# Patient Record
Sex: FEMALE | Race: WHITE | NOT HISPANIC OR LATINO
[De-identification: names, ages, dates, MRNs, and addresses within clinical notes are randomized per-mention and may not be internally consistent; named-entity substitution may affect disease eponyms.]

---

## 2019-02-01 ENCOUNTER — APPOINTMENT (OUTPATIENT)
Dept: ORTHOPEDIC SURGERY | Facility: CLINIC | Age: 59
End: 2019-02-01
Payer: COMMERCIAL

## 2019-02-01 VITALS
WEIGHT: 128 LBS | BODY MASS INDEX: 22.68 KG/M2 | DIASTOLIC BLOOD PRESSURE: 78 MMHG | SYSTOLIC BLOOD PRESSURE: 115 MMHG | HEIGHT: 63 IN

## 2019-02-01 DIAGNOSIS — M67.40 GANGLION, UNSPECIFIED SITE: ICD-10-CM

## 2019-02-01 PROCEDURE — 20610 DRAIN/INJ JOINT/BURSA W/O US: CPT | Mod: RT

## 2019-02-01 PROCEDURE — 99213 OFFICE O/P EST LOW 20 MIN: CPT | Mod: 25

## 2019-02-01 RX ORDER — AZITHROMYCIN 250 MG/1
250 TABLET, FILM COATED ORAL
Qty: 6 | Refills: 0 | Status: COMPLETED | COMMUNITY
Start: 2018-12-17

## 2019-02-01 NOTE — ADDENDUM
[FreeTextEntry1] : Documented by Evelin Lindsay, solely acting as a scribe for Dr. Jalen Murillo on 02/01/2019.\par \par All medical record entries made by the Scribe were at my, Dr. Jalen Murillo, direction and personally dictated by me on 02/01/2019 . I have reviewed the chart and agree that the record accurately reflects my personal performance of the history, physical exam, assessment and plan. I have also personally directed, reviewed, and agreed with the chart.

## 2019-02-01 NOTE — DISCUSSION/SUMMARY
[de-identified] : 58 year old female presents with right knee Ganglion cyst. Patient completed an MRI on 11/20/18, which revealed a complex cystic mass as the posterior aspect of the knee corresponding to the patient's palpable cyst, probable Ganglion cyst. We discussed the nature of the condition and treatment options, including operative and nonoperative intervention for symptom control. Patient wishes to have the cyst aspirated a this time. We discussed the possibility of having the patient excise this cyst at some point in the future. I advised her to attempt therapy with ice as needed. Patient will return on an as needed basis. The patient will call if any questions or problems should arise.

## 2019-02-01 NOTE — PHYSICAL EXAM
[Normal] : Gait: normal [LE] : Sensory: Intact in bilateral lower extremities [DP] : dorsalis pedis 2+ and symmetric bilaterally [PT] : posterior tibial 2+ and symmetric bilaterally [Poor Appearance] : well-appearing [Acute Distress] : not in acute distress [Obese] : not obese [de-identified] : The patient is a well developed, well nourished female in no apparent distress. She is alert and oriented X 3 with a pleasant mood and appropriate affect. \par \par On physical examination of the right knee, there is full range of motion. The patient walks with a normal gait and stands in neutral alignment. There is no effusion. No warmth or erythema is noted. The patella is non tender to palpation medially or laterally. There is no crepitus noted. The apprehension and grind tests are negative. The extensor mechanism is intact. There is no joint line tenderness elicited on palpation. The Marina sign is negative. The Lachman and pivot shift tests are negative. There is no varus or valgus laxity at 0 or 30 degrees. No posterolateral or anteromedial laxity is noted. There is a palpable mass localized to the posterior aspect of her knee. No other soft tissue or bony tenderness is noted. Quadriceps tone is normal. Neurovascular function is intact.  [de-identified] : MRI of the right knee reviewed today by Dr. Murillo shows a complex cystic mass at the posterior aspect of the knee corresponding to the patient's palpable cyst, probable Ganglion cyst.\par Please see attached report for details.

## 2020-03-10 ENCOUNTER — APPOINTMENT (OUTPATIENT)
Dept: ORTHOPEDIC SURGERY | Facility: CLINIC | Age: 60
End: 2020-03-10
Payer: COMMERCIAL

## 2020-03-10 VITALS
SYSTOLIC BLOOD PRESSURE: 120 MMHG | BODY MASS INDEX: 22.5 KG/M2 | HEIGHT: 63 IN | WEIGHT: 127 LBS | OXYGEN SATURATION: 98 % | DIASTOLIC BLOOD PRESSURE: 80 MMHG | HEART RATE: 66 BPM

## 2020-03-10 PROCEDURE — 99213 OFFICE O/P EST LOW 20 MIN: CPT

## 2020-03-18 NOTE — HISTORY OF PRESENT ILLNESS
[de-identified] : Alia returns today for evaluation of her left knee. She developed medial knee pain five weeks ago after going ice skating. She noted some initial swelling. SHe has had restriction in her ROm and some pain with stairs. She has been icing with some relief. SHe denies any instability or locking.  \par \par

## 2020-03-18 NOTE — PHYSICAL EXAM
[de-identified] : The patient is a well developed, well nourished female in no apparent distress. She is alert and oriented X 3 with a pleasant mood and appropriate affect. \par \par On physical examination of the left knee, there is full range of motion. The patient walks with a normal gait and stands in neutral alignment. There is trace  effusion. No warmth or erythema is noted. The patella is non tender to palpation medially or laterally. There is no crepitus noted. The apprehension and grind tests are negative. The extensor mechanism is intact. There is medial joint line tenderness. The Marina sign is absent. The Lachman and pivot shift tests are negative. There is no varus or valgus laxity at 0 or 30 degrees. No posterolateral or anteromedial laxity is noted. No masses are palpable. No other soft tissue or bony tenderness is noted. Quadriceps weakness is noted. Neurovascular function is intact. 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

## 2020-03-18 NOTE — DISCUSSION/SUMMARY
[de-identified] : Alia has mild medial compartment DJD which she irritated when skating. She will contine to ice her knee and will begin a stationary bike riding program. She will return on an as needed basis. She will call if any issues arise.

## 2020-03-18 NOTE — END OF VISIT
[FreeTextEntry3] : All medical record entries made by TERRY Madera, acting as a scribe for this encounter under the direction of Jalen Murillo MD . I have reviewed the chart and agree that the record accurately reflects my personal performance of the history, physical exam, assessment and plan. I have also personally directed, reviewed, and agreed with the chart.

## 2020-08-02 ENCOUNTER — TRANSCRIPTION ENCOUNTER (OUTPATIENT)
Age: 60
End: 2020-08-02

## 2020-09-17 ENCOUNTER — APPOINTMENT (OUTPATIENT)
Dept: ORTHOPEDIC SURGERY | Facility: CLINIC | Age: 60
End: 2020-09-17
Payer: COMMERCIAL

## 2020-09-17 VITALS
WEIGHT: 128 LBS | BODY MASS INDEX: 22.68 KG/M2 | DIASTOLIC BLOOD PRESSURE: 80 MMHG | HEIGHT: 63 IN | SYSTOLIC BLOOD PRESSURE: 100 MMHG

## 2020-09-17 DIAGNOSIS — S83.242A OTHER TEAR OF MEDIAL MENISCUS, CURRENT INJURY, LEFT KNEE, INITIAL ENCOUNTER: ICD-10-CM

## 2020-09-17 DIAGNOSIS — M25.462 EFFUSION, LEFT KNEE: ICD-10-CM

## 2020-09-17 PROCEDURE — 99213 OFFICE O/P EST LOW 20 MIN: CPT

## 2020-09-18 PROBLEM — M25.462 EFFUSION OF LEFT KNEE: Status: ACTIVE | Noted: 2020-03-18

## 2020-09-18 NOTE — HISTORY OF PRESENT ILLNESS
[de-identified] : Alia returns today with a new issue. She has developed progressive medial left knee pain and swelling. She denies any trauma or specific injury. She has had medial knee pain and limitations in flexion. She has cut back on exercise due to discomfort. She denies any locking or buckling.

## 2020-09-18 NOTE — PHYSICAL EXAM
[de-identified] : The patient is a well developed, well nourished female in no apparent distress. She is alert and oriented X 3 with a pleasant mood and appropriate affect.\par \par On physical examination of the left knee, her ROM is 0-125 degrees. The patient walks with a normal gait and stands in neutral alignment. There is trace effusion. No warmth or erythema is noted. The patella is non tender to palpation medially or laterally. There is no crepitus noted. The apprehension and grind tests are negative. The extensor mechanism is intact. There is medial joint line tenderness. The Marina sign is positive. The Lachman and pivot shift tests are negative. There is no varus or valgus laxity at 0 or 30 degrees. No posterolateral or anteromedial laxity is noted. No masses are palpable. No other soft tissue or bony tenderness is noted. Quadriceps weakness is noted. Neurovascular function is intact.

## 2020-09-18 NOTE — DISCUSSION/SUMMARY
[de-identified] : Alia has medial knee pain and swelling. She will begin a course of supervised PT. If unimproved we will consider a cortisone injection in November. All questions were answered. She will call if any issues arise

## 2020-10-28 ENCOUNTER — RESULT REVIEW (OUTPATIENT)
Age: 60
End: 2020-10-28

## 2021-08-16 ENCOUNTER — TRANSCRIPTION ENCOUNTER (OUTPATIENT)
Age: 61
End: 2021-08-16

## 2023-09-05 NOTE — PROCEDURE
[de-identified] : The right knee was prepped with Betadine and under sterile condition the aspiration was performed with a 21 gauge needle. Approximately 10 cc 's of thick sanguineous fluid was removed and sent for studies. The needle was introduced into the joint and aspiration was performed to ensure intra-articular placement. Upon withdrawal of the needle the site was cleaned with alcohol and a band aid applied. The patient tolerated the aspiration well and there were no adverse effects. Post aspiration instructions included no strenuous activity for 24 hours, cryotherapy and if there are any adverse effects to contact the office. 
no diplopia/no photophobia/no loss of vision L/no loss of vision R

## 2024-01-01 NOTE — HISTORY OF PRESENT ILLNESS
[de-identified] : 58 year old female presents to the office for a follow-up evaluation of her right knee.. Patient reports that she has a mass  localized to the posterior medial aspect of the knee, that has been present for the past year. She notes that the size of the mass ranges, and that it is currently not large. Patient saw her PMD, who advised that she consult with a plastic surgeon. After meeting with the plastic surgeon, the patient completed an MRI on 11/20/18, which revealed a complex cystic mass at the posterior aspect of the knee corresponding to the patient's palpable cyst, probable Ganglion cyst. She reports minimal pain but does have some restriction in her flexion ROM.  (2) more than 100 beats/min

## 2025-05-27 ENCOUNTER — APPOINTMENT (OUTPATIENT)
Dept: ORTHOPEDIC SURGERY | Facility: CLINIC | Age: 65
End: 2025-05-27
Payer: MEDICARE

## 2025-05-27 ENCOUNTER — OUTPATIENT (OUTPATIENT)
Dept: OUTPATIENT SERVICES | Facility: HOSPITAL | Age: 65
LOS: 1 days | End: 2025-05-27
Payer: MEDICARE

## 2025-05-27 ENCOUNTER — RESULT REVIEW (OUTPATIENT)
Age: 65
End: 2025-05-27

## 2025-05-27 VITALS
HEIGHT: 63 IN | WEIGHT: 128 LBS | HEART RATE: 61 BPM | SYSTOLIC BLOOD PRESSURE: 131 MMHG | DIASTOLIC BLOOD PRESSURE: 70 MMHG | OXYGEN SATURATION: 97 % | BODY MASS INDEX: 22.68 KG/M2

## 2025-05-27 DIAGNOSIS — M70.41 PREPATELLAR BURSITIS, RIGHT KNEE: ICD-10-CM

## 2025-05-27 PROCEDURE — 73564 X-RAY EXAM KNEE 4 OR MORE: CPT

## 2025-05-27 PROCEDURE — 73564 X-RAY EXAM KNEE 4 OR MORE: CPT | Mod: 26,50

## 2025-05-27 PROCEDURE — 99203 OFFICE O/P NEW LOW 30 MIN: CPT

## 2025-05-27 RX ORDER — DICLOFENAC SODIUM 3 G/100G
3 GEL TOPICAL TWICE DAILY
Qty: 1 | Refills: 0 | Status: ACTIVE | COMMUNITY
Start: 2025-05-27 | End: 1900-01-01

## 2025-05-28 PROBLEM — M70.41 PREPATELLAR BURSITIS OF RIGHT KNEE: Status: ACTIVE | Noted: 2025-05-27

## 2025-09-04 ENCOUNTER — APPOINTMENT (OUTPATIENT)
Dept: ORTHOPEDIC SURGERY | Facility: CLINIC | Age: 65
End: 2025-09-04
Payer: MEDICARE

## 2025-09-04 ENCOUNTER — NON-APPOINTMENT (OUTPATIENT)
Age: 65
End: 2025-09-04

## 2025-09-04 VITALS
TEMPERATURE: 98.2 F | HEART RATE: 62 BPM | WEIGHT: 128 LBS | OXYGEN SATURATION: 96 % | DIASTOLIC BLOOD PRESSURE: 71 MMHG | BODY MASS INDEX: 22.68 KG/M2 | HEIGHT: 63 IN | SYSTOLIC BLOOD PRESSURE: 130 MMHG

## 2025-09-04 DIAGNOSIS — M70.41 PREPATELLAR BURSITIS, RIGHT KNEE: ICD-10-CM

## 2025-09-04 PROCEDURE — 99213 OFFICE O/P EST LOW 20 MIN: CPT
